# Patient Record
Sex: FEMALE | Race: WHITE | Employment: STUDENT | URBAN - METROPOLITAN AREA
[De-identification: names, ages, dates, MRNs, and addresses within clinical notes are randomized per-mention and may not be internally consistent; named-entity substitution may affect disease eponyms.]

---
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
## 2018-01-03 RX ORDER — METRONIDAZOLE 500 MG/1
500 TABLET ORAL 2 TIMES DAILY WITH MEALS
Qty: 14 TABLET | Refills: 0 | Status: SHIPPED | OUTPATIENT
Start: 2018-01-03 | End: 2018-01-10

## 2018-02-15 ENCOUNTER — OFFICE VISIT (OUTPATIENT)
Dept: FAMILY MEDICINE CLINIC | Facility: CLINIC | Age: 18
End: 2018-02-15

## 2018-02-15 VITALS
DIASTOLIC BLOOD PRESSURE: 76 MMHG | OXYGEN SATURATION: 98 % | TEMPERATURE: 98 F | RESPIRATION RATE: 20 BRPM | HEART RATE: 78 BPM | SYSTOLIC BLOOD PRESSURE: 118 MMHG

## 2018-02-15 DIAGNOSIS — N92.6 MISSED PERIOD: ICD-10-CM

## 2018-02-15 DIAGNOSIS — J06.9 VIRAL URI WITH COUGH: Primary | ICD-10-CM

## 2018-02-15 LAB — CONTROL LINE PRESENT WITH A CLEAR BACKGROUND (YES/NO): PRESENT YES/NO

## 2018-02-15 PROCEDURE — 99213 OFFICE O/P EST LOW 20 MIN: CPT | Performed by: NURSE PRACTITIONER

## 2018-02-15 PROCEDURE — 81025 URINE PREGNANCY TEST: CPT | Performed by: NURSE PRACTITIONER

## 2018-02-15 RX ORDER — BENZONATATE 200 MG/1
200 CAPSULE ORAL 3 TIMES DAILY PRN
Qty: 20 CAPSULE | Refills: 0 | Status: SHIPPED | OUTPATIENT
Start: 2018-02-15 | End: 2018-03-10

## 2018-02-15 RX ORDER — FLUTICASONE PROPIONATE 50 MCG
2 SPRAY, SUSPENSION (ML) NASAL DAILY
Qty: 1 BOTTLE | Refills: 0 | Status: SHIPPED | OUTPATIENT
Start: 2018-02-15 | End: 2018-03-10

## 2018-02-15 NOTE — PROGRESS NOTES
CHIEF COMPLAINT:   Patient presents with:  URI: x4 days      HPI:   Klaudia Turner is a 21year old female who presents for upper respiratory symptoms for  4 days.  Patient reports sore throat only at the beginning of sx's, congestion, cough with clear to ye LUNGS: denies shortness of breath or wheezing, See HPI  CARDIOVASCULAR: denies chest pain or palpitations   GI: denies N/V/C or abdominal pain  NEURO: Denies headaches    EXAM:   /76   Pulse 78   Temp 98.4 °F (36.9 °C) (Oral)   Resp 20   LMP 01/09/20 Viral Upper Respiratory Illness (Adult)  You have a viral upper respiratory illness (URI), which is another term for the common cold. This illness is contagious during the first few days. It is spread through the air by coughing and sneezing.  It may also b Call your healthcare provider right away if any of these occur:  · Cough with lots of colored sputum (mucus)  · Severe headache; face, neck, or ear pain  · Difficulty swallowing due to throat pain  · Fever of 100.4°F (38°C) or higher, or as directed by you

## 2018-02-24 ENCOUNTER — LAB ENCOUNTER (OUTPATIENT)
Dept: LAB | Age: 18
End: 2018-02-24
Attending: NURSE PRACTITIONER
Payer: COMMERCIAL

## 2018-02-24 ENCOUNTER — HOSPITAL ENCOUNTER (OUTPATIENT)
Dept: ULTRASOUND IMAGING | Age: 18
Discharge: HOME | End: 2018-02-24
Attending: NURSE PRACTITIONER
Payer: COMMERCIAL

## 2018-02-24 DIAGNOSIS — N92.1 IRREGULAR INTERMENSTRUAL BLEEDING: ICD-10-CM

## 2018-02-24 LAB
BASOPHILS # BLD AUTO: 0.04 X10(3) UL (ref 0–0.1)
BASOPHILS NFR BLD AUTO: 0.7 %
EOSINOPHIL # BLD AUTO: 0.1 X10(3) UL (ref 0–0.3)
EOSINOPHIL NFR BLD AUTO: 1.8 %
ERYTHROCYTE [DISTWIDTH] IN BLOOD BY AUTOMATED COUNT: 14.6 % (ref 11.5–16)
ESTRADIOL: 108.1 PG/ML
FREE T4: 0.9 NG/DL (ref 0.9–1.8)
FSH: 1.8 MIU/ML
HCT VFR BLD AUTO: 39 % (ref 34–50)
HGB BLD-MCNC: 12.4 G/DL (ref 12–16)
IMMATURE GRANULOCYTE COUNT: 0.01 X10(3) UL (ref 0–1)
IMMATURE GRANULOCYTE RATIO %: 0.2 %
LH: 4.9 MIU/ML
LYMPHOCYTES # BLD AUTO: 1.56 X10(3) UL (ref 0.9–4)
LYMPHOCYTES NFR BLD AUTO: 28.8 %
MCH RBC QN AUTO: 27.7 PG (ref 27–33.2)
MCHC RBC AUTO-ENTMCNC: 31.8 G/DL (ref 31–37)
MCV RBC AUTO: 87.1 FL (ref 81–100)
MONOCYTES # BLD AUTO: 0.4 X10(3) UL (ref 0.1–1)
MONOCYTES NFR BLD AUTO: 7.4 %
NEUTROPHIL ABS PRELIM: 3.31 X10 (3) UL (ref 1.3–6.7)
NEUTROPHILS # BLD AUTO: 3.31 X10(3) UL (ref 1.3–6.7)
NEUTROPHILS NFR BLD AUTO: 61.1 %
PLATELET # BLD AUTO: 271 10(3)UL (ref 150–450)
PROLACTIN: 8.8 NG/ML
RBC # BLD AUTO: 4.48 X10(6)UL (ref 3.8–5.1)
RED CELL DISTRIBUTION WIDTH-SD: 46.5 FL (ref 35.1–46.3)
TSI SER-ACNC: 1.81 MIU/ML (ref 0.35–5.5)
WBC # BLD AUTO: 5.4 X10(3) UL (ref 4–13)

## 2018-02-24 PROCEDURE — 84443 ASSAY THYROID STIM HORMONE: CPT

## 2018-02-24 PROCEDURE — 83001 ASSAY OF GONADOTROPIN (FSH): CPT

## 2018-02-24 PROCEDURE — 83002 ASSAY OF GONADOTROPIN (LH): CPT

## 2018-02-24 PROCEDURE — 85025 COMPLETE CBC W/AUTO DIFF WBC: CPT

## 2018-02-24 PROCEDURE — 76830 TRANSVAGINAL US NON-OB: CPT | Performed by: NURSE PRACTITIONER

## 2018-02-24 PROCEDURE — 82670 ASSAY OF TOTAL ESTRADIOL: CPT

## 2018-02-24 PROCEDURE — 84439 ASSAY OF FREE THYROXINE: CPT

## 2018-02-24 PROCEDURE — 36415 COLL VENOUS BLD VENIPUNCTURE: CPT

## 2018-02-24 PROCEDURE — 76856 US EXAM PELVIC COMPLETE: CPT | Performed by: NURSE PRACTITIONER

## 2018-02-24 PROCEDURE — 84146 ASSAY OF PROLACTIN: CPT

## 2018-02-26 NOTE — PROGRESS NOTES
Pelvic ultrasound and lab work were all normal. You are due for your annual exam. Please call our office to- schedule your annual. At that time if you are still interested in alternate contraception, it can be discussed.   [phone]

## 2018-03-10 ENCOUNTER — OFFICE VISIT (OUTPATIENT)
Dept: OBGYN CLINIC | Facility: CLINIC | Age: 18
End: 2018-03-10

## 2018-03-10 VITALS
SYSTOLIC BLOOD PRESSURE: 110 MMHG | BODY MASS INDEX: 26.65 KG/M2 | DIASTOLIC BLOOD PRESSURE: 64 MMHG | HEART RATE: 71 BPM | HEIGHT: 64.5 IN | WEIGHT: 158 LBS

## 2018-03-10 DIAGNOSIS — Z30.41 ENCOUNTER FOR SURVEILLANCE OF CONTRACEPTIVE PILLS: ICD-10-CM

## 2018-03-10 DIAGNOSIS — Z01.419 WELL WOMAN EXAM WITH ROUTINE GYNECOLOGICAL EXAM: Primary | ICD-10-CM

## 2018-03-10 PROCEDURE — 99395 PREV VISIT EST AGE 18-39: CPT | Performed by: NURSE PRACTITIONER

## 2018-03-10 NOTE — PROGRESS NOTES
Here for Routine Annual Exam    Has a history of breakthrough bleeding on oral contraception. She has tried multiple OCP with no improvement. She discontinued her last OCP in 9-10/ 2017 and had continued irregular spotting almost daily.  Decided to disconti

## (undated) NOTE — MR AVS SNAPSHOT
[address], [facility] [address] [address] [phone]               Thank you for choosing us for your health care visit with Ernesto Orellana MD.  We are glad to serve you and happy to provide you with this elizabeth Call your healthcare provider if you find any changes in your breasts that concern you.  These changes may include:  · A lump  · Nipple discharge other than breast milk, especially a bloody discharge  · Swelling  · A change in size or shape  · Skin irritati What changed:  Another medication with the same name was removed. Continue taking this medication, and follow the directions you see here.            Adapalene 0.3 % Gel   Apply a rice sized amount to face nightly, may mix with moisturizer   What changed: not sign up before the expiration date, you must request a new code. Your unique [employer] Access Code: [email or web address]  Expires: 2/11/2017  1:03 PM    If you have questions, you can call [phone] to talk to our [facility] Staff.  Remember, [employer]

## (undated) NOTE — MR AVS SNAPSHOT
[facility]  [address] [id number] [phone]               Thank you for choosing us for your health care visit with MARCELLA Campbell. We are glad to serve you and happy to provide you with this summary of your visit. bleeding, or are taking blood-thinning medicines, talk with your healthcare provider before using these medicines.) Aspirin should never be given to anyone under 25years of age who is ill with a viral infection or fever.  It may cause severe liver or brain BP percentiles are based on 2000 NHANES data         Current Medications          This list is accurate as of: 4/30/17  3:09 PM.  Always use your most recent med list.                ACANYA 1.2-2.5 % Gel   Generic drug:  Clindamycin Phos-Benzoyl [employer] Support Staff. Remember, MyChart is NOT to be used for urgent needs. For medical emergencies, dial 911.            Visit [facility] online at  "[email or web address][email or web address]